# Patient Record
(demographics unavailable — no encounter records)

---

## 2025-01-15 NOTE — REASON FOR VISIT
[Follow-Up Visit] : a follow-up visit for [Ingrown Nail] : ingrown nail [FreeTextEntry2] : [facility]

## 2025-01-15 NOTE — ASSESSMENT
[FreeTextEntry1] : Full examination performed discussed treatment options for ingrown toenails patient may need surgery however would like to avoid ingrown nail surgery we did slant backs curettage cotton packings recommend twice daily soaks and Neosporin also discussed Vaseline to the corners to help soften and prevent ingrown nails in addition we discussed dry thin skin and creamy along with treatment of hammertoes with gentle stretching and shoe gear changes.  Patient will take an occasional extra strength Tylenol if there is pain from the hammertoes or from the ingrown toenails.

## 2025-01-15 NOTE — PHYSICAL EXAM
[General Appearance - Alert] : alert [General Appearance - In No Acute Distress] : in no acute distress [Ankle Swelling (On Exam)] : present [Ankle Swelling Bilaterally] : bilaterally  [Varicose Veins Of Lower Extremities] : bilaterally [] : on both lower extremities [Ankle Swelling On The Right] : mild [Delayed in the Right Toes] : capillary refills normal in right toes [Delayed in the Left Toes] : capillary refills normal in the left toes [1+] : left foot posterior tibialis 1+ [2+] : left foot dorsalis pedis 2+ [de-identified] : Mild hammertoe deformities noted. [FreeTextEntry1] : Ingrown toenails both big toes along with xerotic skin and papules dorsum bilateral. [Sensation] : the sensory exam was normal to light touch and pinprick [No Focal Deficits] : no focal deficits [Deep Tendon Reflexes (DTR)] : deep tendon reflexes were 2+ and symmetric [Motor Exam] : the motor exam was normal [Oriented To Time, Place, And Person] : oriented to person, place, and time [Impaired Insight] : insight and judgment were intact [Affect] : the affect was normal

## 2025-01-15 NOTE — PHYSICAL EXAM
[General Appearance - Alert] : alert [General Appearance - In No Acute Distress] : in no acute distress [Ankle Swelling (On Exam)] : present [Ankle Swelling Bilaterally] : bilaterally  [Varicose Veins Of Lower Extremities] : bilaterally [] : on both lower extremities [Ankle Swelling On The Right] : mild [Delayed in the Right Toes] : capillary refills normal in right toes [Delayed in the Left Toes] : capillary refills normal in the left toes [1+] : left foot posterior tibialis 1+ [2+] : left foot dorsalis pedis 2+ [de-identified] : Mild hammertoe deformities noted. [FreeTextEntry1] : Ingrown toenails both big toes along with xerotic skin and papules dorsum bilateral. [Sensation] : the sensory exam was normal to light touch and pinprick [No Focal Deficits] : no focal deficits [Deep Tendon Reflexes (DTR)] : deep tendon reflexes were 2+ and symmetric [Motor Exam] : the motor exam was normal [Oriented To Time, Place, And Person] : oriented to person, place, and time [Impaired Insight] : insight and judgment were intact [Affect] : the affect was normal

## 2025-01-15 NOTE — REASON FOR VISIT
[Follow-Up Visit] : a follow-up visit for [Ingrown Nail] : ingrown nail [FreeTextEntry2] : [facility]

## 2025-05-03 NOTE — PHYSICAL EXAM
[General Appearance - Alert] : alert [General Appearance - In No Acute Distress] : in no acute distress [General Appearance - Well Nourished] : well nourished [General Appearance - Well Developed] : well developed [General Appearance - Well-Appearing] : healthy appearing [Ankle Swelling (On Exam)] : present [Ankle Swelling Bilaterally] : bilaterally  [Varicose Veins Of Lower Extremities] : bilaterally [] : on both lower extremities [Ankle Swelling On The Right] : mild [Delayed in the Right Toes] : capillary refills normal in right toes [Delayed in the Left Toes] : capillary refills normal in the left toes [1+] : left foot posterior tibialis 1+ [2+] : left foot dorsalis pedis 2+ [de-identified] : Mild hammertoe deformities noted.  [FreeTextEntry1] : Ingrown toenails both big toes along with xerotic skin and papules dorsum bilateral. One ingrown nail is painful on left foot. [Sensation] : the sensory exam was normal to light touch and pinprick [No Focal Deficits] : no focal deficits [Deep Tendon Reflexes (DTR)] : deep tendon reflexes were 2+ and symmetric [Motor Exam] : the motor exam was normal [Oriented To Time, Place, And Person] : oriented to person, place, and time [Impaired Insight] : insight and judgment were intact [Affect] : the affect was normal

## 2025-05-03 NOTE — ASSESSMENT
[FreeTextEntry1] : Full examination performed discussed treatment options for ingrown toenails patient may need surgery however would like to avoid ingrown nail surgery we did slant backs curettage cotton packings recommend twice daily soaks and Neosporin also discussed Vaseline to the corners to help soften and prevent ingrown nails in addition we discussed dry thin skin and creamy along with treatment of hammertoes with gentle stretching and shoe gear changes.  Patient will take an occasional extra strength Tylenol if there is pain from the hammertoes or from the ingrown toenails. Patient also has some signs of onychomycotic toenails.  We discussed treatment options consisting of class IV laser topical medication and oral medication.  Patient will consider options and follow-up.

## 2025-05-03 NOTE — PHYSICAL EXAM
[General Appearance - Alert] : alert [General Appearance - In No Acute Distress] : in no acute distress [General Appearance - Well Nourished] : well nourished [General Appearance - Well Developed] : well developed [General Appearance - Well-Appearing] : healthy appearing [Ankle Swelling (On Exam)] : present [Ankle Swelling Bilaterally] : bilaterally  [Varicose Veins Of Lower Extremities] : bilaterally [] : on both lower extremities [Ankle Swelling On The Right] : mild [Delayed in the Right Toes] : capillary refills normal in right toes [Delayed in the Left Toes] : capillary refills normal in the left toes [1+] : left foot posterior tibialis 1+ [2+] : left foot dorsalis pedis 2+ [de-identified] : Mild hammertoe deformities noted.  [FreeTextEntry1] : Ingrown toenails both big toes along with xerotic skin and papules dorsum bilateral. One ingrown nail is painful on left foot. [Sensation] : the sensory exam was normal to light touch and pinprick [No Focal Deficits] : no focal deficits [Deep Tendon Reflexes (DTR)] : deep tendon reflexes were 2+ and symmetric [Motor Exam] : the motor exam was normal [Oriented To Time, Place, And Person] : oriented to person, place, and time [Impaired Insight] : insight and judgment were intact [Affect] : the affect was normal